# Patient Record
Sex: FEMALE | Race: BLACK OR AFRICAN AMERICAN | NOT HISPANIC OR LATINO | Employment: UNEMPLOYED | URBAN - METROPOLITAN AREA
[De-identification: names, ages, dates, MRNs, and addresses within clinical notes are randomized per-mention and may not be internally consistent; named-entity substitution may affect disease eponyms.]

---

## 2022-04-16 PROBLEM — Z72.0 TOBACCO ABUSE: Status: ACTIVE | Noted: 2022-04-16

## 2022-04-16 PROBLEM — I47.10 SVT (SUPRAVENTRICULAR TACHYCARDIA): Status: ACTIVE | Noted: 2022-04-16

## 2022-04-17 PROBLEM — F14.90 COCAINE USE: Status: ACTIVE | Noted: 2022-04-17

## 2022-04-17 PROBLEM — I47.10 SVT (SUPRAVENTRICULAR TACHYCARDIA): Status: RESOLVED | Noted: 2022-04-16 | Resolved: 2022-04-17

## 2023-01-12 ENCOUNTER — TELEPHONE (OUTPATIENT)
Dept: OBSTETRICS AND GYNECOLOGY | Facility: CLINIC | Age: 48
End: 2023-01-12
Payer: MEDICAID

## 2023-01-12 NOTE — TELEPHONE ENCOUNTER
Returned pts call. Pt did not answer, left vm for pt to give the office a call back     ----- Message from Dilcia Uribe sent at 1/10/2023  1:26 PM CST -----  Regarding: appointment  Please call this patient to schedule an annual.    She is a new patient    Thanks  Dilcia

## 2023-01-12 NOTE — TELEPHONE ENCOUNTER
Returned pts call. Pt did not answer, left vm for pt to give the office a call back     ----- Message from Dilcia Uribe sent at 1/12/2023  2:58 PM CST -----  Please call patient to schedule an annual visit  Patient states this is her second call    Thanks,  Dilcia

## 2024-08-13 ENCOUNTER — TELEPHONE (OUTPATIENT)
Dept: PODIATRY | Facility: CLINIC | Age: 49
End: 2024-08-13
Payer: MEDICAID

## 2024-08-13 NOTE — TELEPHONE ENCOUNTER
Left Message  the soonest is in November. Please call office if you would like to schedule.----- Message from Viktoria Carmen MA sent at 8/13/2024  8:02 AM CDT -----  Contact: [phone]    ----- Message -----  From: Andrei Hammond  Sent: 8/12/2024   4:55 PM CDT  To: [facility] Staff    Type:  Sooner Apoointment Request    Caller is requesting a sooner appointment.  Name of Caller:Pt  When is the first available appointment?  Symptoms:Ingrown toenail with redness  Would the patient rather a call back or a response via [facility]? Call Back  Best Call Back Number:[phone]  Additional Information:

## 2024-10-14 ENCOUNTER — TELEPHONE (OUTPATIENT)
Dept: PODIATRY | Facility: CLINIC | Age: 49
End: 2024-10-14
Payer: MEDICAID

## 2024-10-14 NOTE — TELEPHONE ENCOUNTER
Left message that February is the soonest for a new patient. She can try another location, her PCP or [facility].

## 2025-01-06 ENCOUNTER — TELEPHONE (OUTPATIENT)
Dept: ADMINISTRATIVE | Facility: CLINIC | Age: 50
End: 2025-01-06
Payer: MEDICAID

## 2025-01-06 NOTE — PROGRESS NOTES
"Call placed per ED Navigator to f/u from recent ED visit. Message sent to PCP to reach out to Pt in regards to her recent ED visit and request for ABT"S and something for her cough.    "